# Patient Record
Sex: FEMALE | Race: WHITE | NOT HISPANIC OR LATINO | Employment: FULL TIME
[De-identification: names, ages, dates, MRNs, and addresses within clinical notes are randomized per-mention and may not be internally consistent; named-entity substitution may affect disease eponyms.]

---

## 2017-07-29 ENCOUNTER — HEALTH MAINTENANCE LETTER (OUTPATIENT)
Age: 37
End: 2017-07-29

## 2021-11-08 ENCOUNTER — TRANSFERRED RECORDS (OUTPATIENT)
Dept: HEALTH INFORMATION MANAGEMENT | Facility: CLINIC | Age: 41
End: 2021-11-08
Payer: COMMERCIAL

## 2021-11-16 ENCOUNTER — TRANSFERRED RECORDS (OUTPATIENT)
Dept: HEALTH INFORMATION MANAGEMENT | Facility: CLINIC | Age: 41
End: 2021-11-16
Payer: COMMERCIAL

## 2021-11-18 ENCOUNTER — TRANSCRIBE ORDERS (OUTPATIENT)
Dept: OTHER | Age: 41
End: 2021-11-18

## 2021-11-18 DIAGNOSIS — R79.89 ELEVATED PROLACTIN LEVEL: Primary | ICD-10-CM

## 2021-12-01 NOTE — TELEPHONE ENCOUNTER
RECORDS RECEIVED FROM: external   DATE RECEIVED: 12.13.21   NOTES (FOR ALL VISITS) STATUS DETAILS   OFFICE NOTES from referring provider In process 11.18.21 [facility]   OFFICE NOTES from other specialist N/A    ED NOTES N/A    OPERATIVE REPORT  (thyroid, pituitary, adrenal, parathyroid) N/A    MEDICATION LIST Internal    IMAGING      DEXASCAN N/A    MRI (BRAIN) N/A    XR (Chest) N/A    CT (HEAD/NECK/CHEST/ABDOMEN) N/A    NUCLEAR  N/A    ULTRASOUND (HEAD/NECK) N/A    LABS     DIABETES: HBGA1C, CREATININE, FASTING LIPIDS, MICROALBUMIN URINE, POTASSIUM, TSH, T4    THYROID: TSH, T4, CBC, THYRODLONULIN, TOTAL T3, FREE T4, CALCITONIN, CEA N/A           Action 12.1.21 MJ   Action Taken Called [facility] at [phone]  [facility] fax number: [phone]. Sent request.     -12.6.21 sv - records request sent to [facility]- [phone]     -12.9.21 sv-   records request sent to [facility]- [phone]  -12.10.21 sv- called and spoke with medical records, per staff then will send it ASAP today. Provided fax number   - received records from [facility]- sent to STAT uploading   -12.13.21 sv- records havent been uploaded. Sending records to uploading again

## 2021-12-13 ENCOUNTER — VIRTUAL VISIT (OUTPATIENT)
Dept: ENDOCRINOLOGY | Facility: CLINIC | Age: 41
End: 2021-12-13
Attending: NURSE PRACTITIONER
Payer: COMMERCIAL

## 2021-12-13 ENCOUNTER — PRE VISIT (OUTPATIENT)
Dept: ENDOCRINOLOGY | Facility: CLINIC | Age: 41
End: 2021-12-13

## 2021-12-13 DIAGNOSIS — E22.1 HYPERPROLACTINEMIA (H): Primary | ICD-10-CM

## 2021-12-13 PROCEDURE — 99204 OFFICE O/P NEW MOD 45 MIN: CPT | Mod: 95 | Performed by: INTERNAL MEDICINE

## 2021-12-13 RX ORDER — VENLAFAXINE HYDROCHLORIDE 150 MG/1
450 CAPSULE, EXTENDED RELEASE ORAL DAILY
COMMUNITY
Start: 2020-08-04

## 2021-12-13 RX ORDER — HYDROCHLOROTHIAZIDE 50 MG/1
1 TABLET ORAL DAILY
COMMUNITY
Start: 2021-09-17 | End: 2022-06-30 | Stop reason: ALTCHOICE

## 2021-12-13 RX ORDER — ALPRAZOLAM 2 MG
2 TABLET ORAL 2 TIMES DAILY PRN
COMMUNITY
Start: 2020-07-28

## 2021-12-13 RX ORDER — LISINOPRIL 20 MG/1
1 TABLET ORAL DAILY
COMMUNITY
Start: 2021-09-17

## 2021-12-13 RX ORDER — BUPROPION HYDROCHLORIDE 300 MG/1
300 TABLET ORAL EVERY MORNING
COMMUNITY

## 2021-12-13 RX ORDER — BUPROPION HYDROCHLORIDE 150 MG/1
150 TABLET ORAL EVERY MORNING
COMMUNITY

## 2021-12-13 NOTE — LETTER
"2021       RE: Sapphire Bautista  [address] [address]     Dear Colleague,    Thank you for referring your patient, Sapphire Bautista, to the [facility] [place] at [facility]. Please see a copy of my visit note below.      ENDOCRINOLOGY VIDEO VISIT NEW        HISTORY OF PRESENT ILLNESS    Sapphire Bautista is a 41 year old female who is being evaluated via a billable video visit. The patient is seen in consultation at the request of RENE Mccullough for hyperprolactinemia.    Received and reviewed records from [facility].    The patient was noted to have hyperprolactinemia in 2021 (results as detailed below) during evaluation for hormonal issues contributing to her symptoms.    The patient had been experiencing approximately 6 months (potentially slightly more) of hot flashes, increased diaphoresis, thinning hair, diminished libido and fluctuations in weight.  She estimates weight gain over the past year (she has history of restrictive eating disorder and does not regularly weigh herself but noticed changes in weight at medical appointments) but then experienced approximately 30 pound weight loss between summer 2021 and 2021.    She has not noted galactorrhea.  She is , with about 4 pregnancies ending in miscarriage.  She has no plans for conception in the future.  She had a uterine ablation for menorrhagia so menstrual cycles are lighter and last 1 to 2 days but are occurring every 32 to 36 days.    She has noted headaches which have been occurring daily and have been \"awful.\"  Headaches are typically frontal, sometimes occipital and have been present for some time, uncertain how long.  No associated visual changes or visual field cuts.    The patient has history of depression and anxiety.  She was prescribed Latuda which she took for about 3 weeks (was on the medication when she had prolactin level " checked).  She is now off the medication.    No herbals, no over-the-counter supplements outside of what is listed on medication list.  No high-dose biotin use.    Hormone therapy was initiated at gynecology practice: Was prescribed Prometrium which she took but discontinued due to issues with refills (also did not notice improvement in her symptoms).  Testosterone therapy was being contemplated by her gynecology provider but has been deferred until endocrine work-up has been completed.    No history of kidney disease.     Family history: Reviewed as below.  No history of pituitary tumors, pancreatic tumors, hyperparathyroidism/hypercalcemia, thyroid disease or thyroid cancer.    I received and reviewed outside records from referring clinic.  11/8/2021: Estradiol 102, DHEA-sulfate 204 (), FSH 1.6, prolactin 174.8, TSH 2.3, free T4 0.8, free T3 3.1, vitamin D 25 hydroxy 21, B12 258 (200-1100), testosterone 19 (2-45).    Pertinent Social History: , has 6 children.  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    PAST MEDICAL HISTORY  Past Medical History:   Diagnosis Date     Abnormal Pap smear 2005    leep. normal since     Diabetes mellitus (H)     GDDC     Other chronic pain      PONV (postoperative nausea and vomiting)        MEDICATIONS  Current Outpatient Medications   Medication Sig Dispense Refill     Acetaminophen (TYLENOL PO)        ALPRAZolam (XANAX) 2 MG tablet Take 2 mg by mouth 2 times daily as needed       buPROPion (WELLBUTRIN XL) 150 MG 24 hr tablet Take 150 mg by mouth every morning       buPROPion (WELLBUTRIN XL) 300 MG 24 hr tablet Take 300 mg by mouth every morning       hydrochlorothiazide (HYDRODIURIL) 50 MG tablet Take 1 tablet by mouth daily       ibuprofen (ADVIL,MOTRIN) 600 MG tablet Take 1 tablet (600 mg) by mouth every 6 hours as needed for pain (mild) 30 tablet  0     lisinopril (ZESTRIL) 20 MG tablet Take 1 tablet by mouth daily       venlafaxine (EFFEXOR-XR) 150 MG 24 hr capsule Take 450 mg by mouth daily       benzonatate (TESSALON) 100 MG capsule Take 2 capsules (200 mg) by mouth 3 times daily as needed for cough (Patient not taking: Reported on 12/13/2021) 24 capsule 0     FLUoxetine HCl (PROZAC PO) Take 20 mg by mouth daily (Patient not taking: Reported on 12/13/2021)       senna-docusate (SENOKOT-S;PERICOLACE) 8.6-50 MG per tablet Take 1-2 tablets by mouth 2 times daily Take while on oral narcotics to prevent or treat constipation. (Patient not taking: Reported on 12/13/2021) 30 tablet 0       Allergies, family, and social history were reviewed and documented as needed in EHR.     REVIEW OF SYSTEMS  A complete 10-point ROS was performed and pertinent positives and negatives are noted in the HPI.    PHYSICAL EXAM  There were no vitals taken for this visit.  There is no height or weight on file to calculate BMI.  Constitutional: Patient is alert, oriented and is sometimes tearful during our interview.    Eyes: Eyes grossly normal to inspection, EOMI, no stare, lid lag, or retraction; no conjunctival injection.  ENMT: Lips are without lesions.   Neck: No visible goiter or neck mass.  Respiratory: No audible wheeze or cough. No visible cyanosis. No visible increased work of breathing.  Neurological: Alert and oriented times 3.  Cranial nerves grossly intact.      DATA REVIEW  Each of the following laboratory and/or imaging studies were reviewed.  Outside labs reviewed, as summarized in HPI.    ASSESSMENT  1.  Hyperprolactinemia.  Was on Latuda, which could potentially cause hyperprolactinemia.  Now off the medication: Would recheck prolactin now.  Thyroid function tests last month were normal, recheck to confirm they remain in normal range.  Would also check CMP and hCG.  If hyperprolactinemia persists and no additional cause has been identified, would proceed with  pituitary MRI to screen for pituitary adenoma or central lesion with stalk effect causing hyperprolactinemia.  We discussed potential causes of hyperprolactinemia as well as proposed testing.    2.  Hot flashes and diaphoresis.  Potentially related to lowered gonadotropin levels and lower estradiol levels with hyperprolactinemia.  Would also screen for excess growth hormone.  Hormone therapy (including testosterone) was proposed by her healthcare provider in gynecology clinic: Would recommend deferring this given our ongoing hormonal evaluation and the potential for androgen excess and consequent symptoms.    3.  Anxiety and depression.  Following with psychiatrist and therapist.    PLAN  -Labs at earliest convenience  -If prolactin is elevated, would proceed with pituitary MRI; if prolactin is normal, would recheck in 1 to 2 weeks  -Hold off on hormonal therapies  -Follow-up for face-to-face visit in [place] at my earliest opening  -We will communicate results by phone (we also help set up [employer] access for future communication)    Orders Placed This Encounter   Procedures     Prolactin     Insulin Growth Factor 1 by Immunoassay     TSH     T4 free     Comprehensive metabolic panel     HCG Quantitative Pregnancy, Blood ([id number])     Video-Visit Details    Type of service:  Video Visit    Video Start Time: 11:55 AM  Video End Time: 12:27 PM    I spent a total of 48 minutes on the date of encounter reviewing medical records, evaluating the patient, coordinating care and documenting in the EHR, as detailed above.      Platform used for Video Visit: Atul Min MD   Division of Diabetes, Endocrinology and Metabolism  Department of Medicine      cc: Danita LÓPEZ; Мария Vazquez DO; Tony Núñez MD        Sapphire is a 41 year old who is being evaluated via a billable video visit.      How would you like to obtain your AVS? Mail a copy  If the video visit is dropped, the invitation should  be resent by: Text to cell phone: [phone]  Will anyone else be joining your video visit? No        Addendum 12/14/2021: Recheck prolactin normal.  TSH 1.83.  hCG negative.  IGF-1 pending. Clinic staff will contact patient with results: I would recommend a recheck of prolactin in early January 2022.  Traci Min MD

## 2021-12-13 NOTE — PROGRESS NOTES
"  ENDOCRINOLOGY VIDEO VISIT NEW        HISTORY OF PRESENT ILLNESS    Sapphire Bautista is a 41 year old female who is being evaluated via a billable video visit. The patient is seen in consultation at the request of RENE Mccullough for hyperprolactinemia.    Received and reviewed records from [facility].    The patient was noted to have hyperprolactinemia in 2021 (results as detailed below) during evaluation for hormonal issues contributing to her symptoms.    The patient had been experiencing approximately 6 months (potentially slightly more) of hot flashes, increased diaphoresis, thinning hair, diminished libido and fluctuations in weight.  She estimates weight gain over the past year (she has history of restrictive eating disorder and does not regularly weigh herself but noticed changes in weight at medical appointments) but then experienced approximately 30 pound weight loss between summer 2021 and 2021.    She has not noted galactorrhea.  She is , with about 4 pregnancies ending in miscarriage.  She has no plans for conception in the future.  She had a uterine ablation for menorrhagia so menstrual cycles are lighter and last 1 to 2 days but are occurring every 32 to 36 days.    She has noted headaches which have been occurring daily and have been \"awful.\"  Headaches are typically frontal, sometimes occipital and have been present for some time, uncertain how long.  No associated visual changes or visual field cuts.    The patient has history of depression and anxiety.  She was prescribed Latuda which she took for about 3 weeks (was on the medication when she had prolactin level checked).  She is now off the medication.    No herbals, no over-the-counter supplements outside of what is listed on medication list.  No high-dose biotin use.    Hormone therapy was initiated at gynecology practice: Was prescribed Prometrium which she took but discontinued due to issues with refills (also did " not notice improvement in her symptoms).  Testosterone therapy was being contemplated by her gynecology provider but has been deferred until endocrine work-up has been completed.    No history of kidney disease.     Family history: Reviewed as below.  No history of pituitary tumors, pancreatic tumors, hyperparathyroidism/hypercalcemia, thyroid disease or thyroid cancer.    I received and reviewed outside records from referring clinic.  11/8/2021: Estradiol 102, DHEA-sulfate 204 (), FSH 1.6, prolactin 174.8, TSH 2.3, free T4 0.8, free T3 3.1, vitamin D 25 hydroxy 21, B12 258 (200-1100), testosterone 19 (2-45).    Pertinent Social History: , has 6 children.  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    PAST MEDICAL HISTORY  Past Medical History:   Diagnosis Date     Abnormal Pap smear 2005    leep. normal since     Diabetes mellitus (H)     GDDC     Other chronic pain      PONV (postoperative nausea and vomiting)        MEDICATIONS  Current Outpatient Medications   Medication Sig Dispense Refill     Acetaminophen (TYLENOL PO)        ALPRAZolam (XANAX) 2 MG tablet Take 2 mg by mouth 2 times daily as needed       buPROPion (WELLBUTRIN XL) 150 MG 24 hr tablet Take 150 mg by mouth every morning       buPROPion (WELLBUTRIN XL) 300 MG 24 hr tablet Take 300 mg by mouth every morning       hydrochlorothiazide (HYDRODIURIL) 50 MG tablet Take 1 tablet by mouth daily       ibuprofen (ADVIL,MOTRIN) 600 MG tablet Take 1 tablet (600 mg) by mouth every 6 hours as needed for pain (mild) 30 tablet 0     lisinopril (ZESTRIL) 20 MG tablet Take 1 tablet by mouth daily       venlafaxine (EFFEXOR-XR) 150 MG 24 hr capsule Take 450 mg by mouth daily       benzonatate (TESSALON) 100 MG capsule Take 2 capsules (200 mg) by mouth 3 times daily as needed for cough (Patient not taking: Reported on 12/13/2021) 24  capsule 0     FLUoxetine HCl (PROZAC PO) Take 20 mg by mouth daily (Patient not taking: Reported on 12/13/2021)       senna-docusate (SENOKOT-S;PERICOLACE) 8.6-50 MG per tablet Take 1-2 tablets by mouth 2 times daily Take while on oral narcotics to prevent or treat constipation. (Patient not taking: Reported on 12/13/2021) 30 tablet 0       Allergies, family, and social history were reviewed and documented as needed in EHR.     REVIEW OF SYSTEMS  A complete 10-point ROS was performed and pertinent positives and negatives are noted in the HPI.    PHYSICAL EXAM  There were no vitals taken for this visit.  There is no height or weight on file to calculate BMI.  Constitutional: Patient is alert, oriented and is sometimes tearful during our interview.    Eyes: Eyes grossly normal to inspection, EOMI, no stare, lid lag, or retraction; no conjunctival injection.  ENMT: Lips are without lesions.   Neck: No visible goiter or neck mass.  Respiratory: No audible wheeze or cough. No visible cyanosis. No visible increased work of breathing.  Neurological: Alert and oriented times 3.  Cranial nerves grossly intact.      DATA REVIEW  Each of the following laboratory and/or imaging studies were reviewed.  Outside labs reviewed, as summarized in HPI.    ASSESSMENT  1.  Hyperprolactinemia.  Was on Latuda, which could potentially cause hyperprolactinemia.  Now off the medication: Would recheck prolactin now.  Thyroid function tests last month were normal, recheck to confirm they remain in normal range.  Would also check CMP and hCG.  If hyperprolactinemia persists and no additional cause has been identified, would proceed with pituitary MRI to screen for pituitary adenoma or central lesion with stalk effect causing hyperprolactinemia.  We discussed potential causes of hyperprolactinemia as well as proposed testing.    2.  Hot flashes and diaphoresis.  Potentially related to lowered gonadotropin levels and lower estradiol levels with  hyperprolactinemia.  Would also screen for excess growth hormone.  Hormone therapy (including testosterone) was proposed by her healthcare provider in gynecology clinic: Would recommend deferring this given our ongoing hormonal evaluation and the potential for androgen excess and consequent symptoms.    3.  Anxiety and depression.  Following with psychiatrist and therapist.    PLAN  -Labs at earliest convenience  -If prolactin is elevated, would proceed with pituitary MRI; if prolactin is normal, would recheck in 1 to 2 weeks  -Hold off on hormonal therapies  -Follow-up for face-to-face visit in [place] at my earliest opening  -We will communicate results by phone (we also help set up Virtual Web access for future communication)    Orders Placed This Encounter   Procedures     Prolactin     Insulin Growth Factor 1 by Immunoassay     TSH     T4 free     Comprehensive metabolic panel     HCG Quantitative Pregnancy, Blood ([id number])     Video-Visit Details    Type of service:  Video Visit    Video Start Time: 11:55 AM  Video End Time: 12:27 PM    I spent a total of 48 minutes on the date of encounter reviewing medical records, evaluating the patient, coordinating care and documenting in the EHR, as detailed above.      Platform used for Video Visit: Atul Min MD   Division of Diabetes, Endocrinology and Metabolism  Department of Medicine      cc: Danita LÓPEZ; Мария Vazquez DO; Tony Núñez MD        Sapphire is a 41 year old who is being evaluated via a billable video visit.      How would you like to obtain your AVS? Mail a copy  If the video visit is dropped, the invitation should be resent by: Text to cell phone: [phone]  Will anyone else be joining your video visit? No        Addendum 12/14/2021: Recheck prolactin normal.  TSH 1.83.  hCG negative.  IGF-1 pending. Clinic staff will contact patient with results: I would recommend a recheck of prolactin in early January 2022.  Traci  MD Pako

## 2021-12-13 NOTE — LETTER
"2021      RE: Sapphire Bautista  [address] [place] [id number]         ENDOCRINOLOGY VIDEO VISIT NEW        HISTORY OF PRESENT ILLNESS    Sapphire Bautista is a 41 year old female who is being evaluated via a billable video visit. The patient is seen in consultation at the request of RENE Mccullough for hyperprolactinemia.    Received and reviewed records from [facility].    The patient was noted to have hyperprolactinemia in 2021 (results as detailed below) during evaluation for hormonal issues contributing to her symptoms.    The patient had been experiencing approximately 6 months (potentially slightly more) of hot flashes, increased diaphoresis, thinning hair, diminished libido and fluctuations in weight.  She estimates weight gain over the past year (she has history of restrictive eating disorder and does not regularly weigh herself but noticed changes in weight at medical appointments) but then experienced approximately 30 pound weight loss between summer 2021 and 2021.    She has not noted galactorrhea.  She is , with about 4 pregnancies ending in miscarriage.  She has no plans for conception in the future.  She had a uterine ablation for menorrhagia so menstrual cycles are lighter and last 1 to 2 days but are occurring every 32 to 36 days.    She has noted headaches which have been occurring daily and have been \"awful.\"  Headaches are typically frontal, sometimes occipital and have been present for some time, uncertain how long.  No associated visual changes or visual field cuts.    The patient has history of depression and anxiety.  She was prescribed Latuda which she took for about 3 weeks (was on the medication when she had prolactin level checked).  She is now off the medication.    No herbals, no over-the-counter supplements outside of what is listed on medication list.  No high-dose biotin use.    Hormone therapy was initiated at gynecology practice: Was prescribed " Prometrium which she took but discontinued due to issues with refills (also did not notice improvement in her symptoms).  Testosterone therapy was being contemplated by her gynecology provider but has been deferred until endocrine work-up has been completed.    No history of kidney disease.     Family history: Reviewed as below.  No history of pituitary tumors, pancreatic tumors, hyperparathyroidism/hypercalcemia, thyroid disease or thyroid cancer.    I received and reviewed outside records from referring clinic.  11/8/2021: Estradiol 102, DHEA-sulfate 204 (), FSH 1.6, prolactin 174.8, TSH 2.3, free T4 0.8, free T3 3.1, vitamin D 25 hydroxy 21, B12 258 (200-1100), testosterone 19 (2-45).    Pertinent Social History: , has 6 children.  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    PAST MEDICAL HISTORY  Past Medical History:   Diagnosis Date     Abnormal Pap smear 2005    leep. normal since     Diabetes mellitus (H)     GDDC     Other chronic pain      PONV (postoperative nausea and vomiting)        MEDICATIONS  Current Outpatient Medications   Medication Sig Dispense Refill     Acetaminophen (TYLENOL PO)        ALPRAZolam (XANAX) 2 MG tablet Take 2 mg by mouth 2 times daily as needed       buPROPion (WELLBUTRIN XL) 150 MG 24 hr tablet Take 150 mg by mouth every morning       buPROPion (WELLBUTRIN XL) 300 MG 24 hr tablet Take 300 mg by mouth every morning       hydrochlorothiazide (HYDRODIURIL) 50 MG tablet Take 1 tablet by mouth daily       ibuprofen (ADVIL,MOTRIN) 600 MG tablet Take 1 tablet (600 mg) by mouth every 6 hours as needed for pain (mild) 30 tablet 0     lisinopril (ZESTRIL) 20 MG tablet Take 1 tablet by mouth daily       venlafaxine (EFFEXOR-XR) 150 MG 24 hr capsule Take 450 mg by mouth daily       benzonatate (TESSALON) 100 MG capsule Take 2 capsules (200 mg) by mouth 3  times daily as needed for cough (Patient not taking: Reported on 12/13/2021) 24 capsule 0     FLUoxetine HCl (PROZAC PO) Take 20 mg by mouth daily (Patient not taking: Reported on 12/13/2021)       senna-docusate (SENOKOT-S;PERICOLACE) 8.6-50 MG per tablet Take 1-2 tablets by mouth 2 times daily Take while on oral narcotics to prevent or treat constipation. (Patient not taking: Reported on 12/13/2021) 30 tablet 0       Allergies, family, and social history were reviewed and documented as needed in EHR.     REVIEW OF SYSTEMS  A complete 10-point ROS was performed and pertinent positives and negatives are noted in the HPI.    PHYSICAL EXAM  There were no vitals taken for this visit.  There is no height or weight on file to calculate BMI.  Constitutional: Patient is alert, oriented and is sometimes tearful during our interview.    Eyes: Eyes grossly normal to inspection, EOMI, no stare, lid lag, or retraction; no conjunctival injection.  ENMT: Lips are without lesions.   Neck: No visible goiter or neck mass.  Respiratory: No audible wheeze or cough. No visible cyanosis. No visible increased work of breathing.  Neurological: Alert and oriented times 3.  Cranial nerves grossly intact.      DATA REVIEW  Each of the following laboratory and/or imaging studies were reviewed.  Outside labs reviewed, as summarized in HPI.    ASSESSMENT  1.  Hyperprolactinemia.  Was on Latuda, which could potentially cause hyperprolactinemia.  Now off the medication: Would recheck prolactin now.  Thyroid function tests last month were normal, recheck to confirm they remain in normal range.  Would also check CMP and hCG.  If hyperprolactinemia persists and no additional cause has been identified, would proceed with pituitary MRI to screen for pituitary adenoma or central lesion with stalk effect causing hyperprolactinemia.  We discussed potential causes of hyperprolactinemia as well as proposed testing.    2.  Hot flashes and diaphoresis.   Potentially related to lowered gonadotropin levels and lower estradiol levels with hyperprolactinemia.  Would also screen for excess growth hormone.  Hormone therapy (including testosterone) was proposed by her healthcare provider in gynecology clinic: Would recommend deferring this given our ongoing hormonal evaluation and the potential for androgen excess and consequent symptoms.    3.  Anxiety and depression.  Following with psychiatrist and therapist.    PLAN  -Labs at earliest convenience  -If prolactin is elevated, would proceed with pituitary MRI; if prolactin is normal, would recheck in 1 to 2 weeks  -Hold off on hormonal therapies  -Follow-up for face-to-face visit in [place] at my earliest opening  -We will communicate results by phone (we also help set up [employer] access for future communication)    Orders Placed This Encounter   Procedures     Prolactin     Insulin Growth Factor 1 by Immunoassay     TSH     T4 free     Comprehensive metabolic panel     HCG Quantitative Pregnancy, Blood ([id number])     Video-Visit Details    Type of service:  Video Visit    Video Start Time: 11:55 AM  Video End Time: 12:27 PM    I spent a total of 48 minutes on the date of encounter reviewing medical records, evaluating the patient, coordinating care and documenting in the EHR, as detailed above.      Platform used for Video Visit: Atul Min MD   Division of Diabetes, Endocrinology and Metabolism  Department of Medicine      cc: Danita LÓPEZ; Мария Vazquez DO; Tony Núñez MD        Sapphire is a 41 year old who is being evaluated via a billable video visit.      How would you like to obtain your AVS? Mail a copy  If the video visit is dropped, the invitation should be resent by: Text to cell phone: [phone]  Will anyone else be joining your video visit? No        Addendum 12/14/2021: Recheck prolactin normal.  TSH 1.83.  hCG negative.  IGF-1 pending. Clinic staff will contact patient  with results: I would recommend a recheck of prolactin in early January 2022.  Traci Min MD

## 2021-12-13 NOTE — PATIENT INSTRUCTIONS
-Labs at earliest convenience  -If prolactin is elevated, would proceed with pituitary MRI; if prolactin is normal, would recheck in 1 to 2 weeks  -Hold off on hormonal therapies  -Follow-up for face-to-face visit in [place] at my earliest opening  -We will communicate results by phone (we also help set up [employer] access for future communication)

## 2021-12-14 ENCOUNTER — LAB (OUTPATIENT)
Dept: LAB | Facility: OTHER | Age: 41
End: 2021-12-14
Payer: COMMERCIAL

## 2021-12-14 ENCOUNTER — MYC MEDICAL ADVICE (OUTPATIENT)
Dept: ENDOCRINOLOGY | Facility: CLINIC | Age: 41
End: 2021-12-14

## 2021-12-14 ENCOUNTER — TELEPHONE (OUTPATIENT)
Dept: ENDOCRINOLOGY | Facility: CLINIC | Age: 41
End: 2021-12-14

## 2021-12-14 DIAGNOSIS — R53.83 FATIGUE, UNSPECIFIED TYPE: Primary | ICD-10-CM

## 2021-12-14 DIAGNOSIS — E22.1 HYPERPROLACTINEMIA (H): ICD-10-CM

## 2021-12-14 DIAGNOSIS — R23.2 HOT FLASHES: ICD-10-CM

## 2021-12-14 DIAGNOSIS — F41.9 ANXIETY: ICD-10-CM

## 2021-12-14 LAB
ALBUMIN SERPL-MCNC: 3.5 G/DL (ref 3.4–5)
ALP SERPL-CCNC: 82 U/L (ref 40–150)
ALT SERPL W P-5'-P-CCNC: 25 U/L (ref 0–50)
ANION GAP SERPL CALCULATED.3IONS-SCNC: 7 MMOL/L (ref 3–14)
AST SERPL W P-5'-P-CCNC: 13 U/L (ref 0–45)
B-HCG SERPL-ACNC: <1 IU/L (ref 0–5)
BILIRUB SERPL-MCNC: 0.3 MG/DL (ref 0.2–1.3)
BUN SERPL-MCNC: 18 MG/DL (ref 7–30)
CALCIUM SERPL-MCNC: 8.8 MG/DL (ref 8.5–10.1)
CHLORIDE BLD-SCNC: 100 MMOL/L (ref 94–109)
CO2 SERPL-SCNC: 29 MMOL/L (ref 20–32)
CREAT SERPL-MCNC: 0.92 MG/DL (ref 0.52–1.04)
GFR SERPL CREATININE-BSD FRML MDRD: 77 ML/MIN/1.73M2
GLUCOSE BLD-MCNC: 123 MG/DL (ref 70–99)
POTASSIUM BLD-SCNC: 3.5 MMOL/L (ref 3.4–5.3)
PROLACTIN SERPL-MCNC: 15 UG/L (ref 3–27)
PROT SERPL-MCNC: 7.4 G/DL (ref 6.8–8.8)
SODIUM SERPL-SCNC: 136 MMOL/L (ref 133–144)
T4 FREE SERPL-MCNC: 0.8 NG/DL (ref 0.76–1.46)
TSH SERPL DL<=0.005 MIU/L-ACNC: 1.83 MU/L (ref 0.4–4)

## 2021-12-14 PROCEDURE — 84443 ASSAY THYROID STIM HORMONE: CPT

## 2021-12-14 PROCEDURE — 80053 COMPREHEN METABOLIC PANEL: CPT

## 2021-12-14 PROCEDURE — 84702 CHORIONIC GONADOTROPIN TEST: CPT

## 2021-12-14 PROCEDURE — 84146 ASSAY OF PROLACTIN: CPT

## 2021-12-14 PROCEDURE — 36415 COLL VENOUS BLD VENIPUNCTURE: CPT

## 2021-12-14 PROCEDURE — 84305 ASSAY OF SOMATOMEDIN: CPT

## 2021-12-14 PROCEDURE — 84439 ASSAY OF FREE THYROXINE: CPT

## 2021-12-14 NOTE — TELEPHONE ENCOUNTER
[facility] Call Center    Phone Message    May a detailed message be left on voicemail: yes     Reason for Call: Other: Per patient has questions about recent lab results.      Action Taken: Other: ENDO    Travel Screening: Not Applicable

## 2021-12-14 NOTE — RESULT ENCOUNTER NOTE
Team - please call patient with the following message:  -Recheck prolactin is normal  -Thyroid function normal, with TSH 1.83  -hCG negative  -IGF-1 (marker for growth hormone) is pending  -I would recommend a recheck of prolactin in about 2 weeks. Orders are in [facility] system.   -Would you please confirm if patient has set up MyChart--if so, please let me know and I can send future results via [employer]  Traci Min MD

## 2021-12-15 ENCOUNTER — MYC MEDICAL ADVICE (OUTPATIENT)
Dept: ENDOCRINOLOGY | Facility: CLINIC | Age: 41
End: 2021-12-15
Payer: COMMERCIAL

## 2021-12-15 DIAGNOSIS — R53.83 FATIGUE, UNSPECIFIED TYPE: ICD-10-CM

## 2021-12-15 DIAGNOSIS — F41.9 ANXIETY: ICD-10-CM

## 2021-12-15 DIAGNOSIS — R23.2 HOT FLASHES: ICD-10-CM

## 2021-12-17 LAB — IGF-I BLD-MCNC: 188 NG/ML (ref 71–258)

## 2021-12-22 LAB
COLLECT DURATION TIME UR: 24 H
CREAT 24H UR-MRATE: 1.13 G/SPEC (ref 0.8–1.8)
CREAT UR-MCNC: 42 MG/DL
SPECIMEN VOL UR: 2700 ML

## 2021-12-22 PROCEDURE — 81050 URINALYSIS VOLUME MEASURE: CPT | Performed by: PATHOLOGY

## 2021-12-22 PROCEDURE — 82384 ASSAY THREE CATECHOLAMINES: CPT | Mod: 90 | Performed by: PATHOLOGY

## 2021-12-22 PROCEDURE — 82530 CORTISOL FREE: CPT | Mod: 90 | Performed by: PATHOLOGY

## 2021-12-22 PROCEDURE — 99000 SPECIMEN HANDLING OFFICE-LAB: CPT | Performed by: PATHOLOGY

## 2021-12-22 PROCEDURE — 82570 ASSAY OF URINE CREATININE: CPT | Performed by: PATHOLOGY

## 2021-12-22 PROCEDURE — 83497 ASSAY OF 5-HIAA: CPT | Mod: 90 | Performed by: PATHOLOGY

## 2021-12-22 PROCEDURE — 83835 ASSAY OF METANEPHRINES: CPT | Mod: 90 | Performed by: PATHOLOGY

## 2021-12-25 LAB
ANNOTATION COMMENT IMP: NORMAL
CORTIS F 24H UR HPLC-MCNC: 2.93 UG/L
CORTIS F 24H UR-MRATE: 7.9 UG/D
CORTIS F/CREAT 24H UR: 7.32 UG/G CRT
CREAT 24H UR-MRATE: 1080 MG/D
CREAT 24H UR-MRATE: 1134 MG/D
CREAT UR-MCNC: 40 MG/DL
CREAT UR-MCNC: 42 MG/DL
METANEPH 24H UR-MCNC: 27 UG/L
METANEPH 24H UR-MRATE: 73 UG/D
METANEPH+NORMETANEPH UR-IMP: ABNORMAL
METANEPH/CREAT 24H UR: 64 UG/G CRT
NORMETANEPHRINE 24H UR-MCNC: 203 UG/L
NORMETANEPHRINE 24H UR-MRATE: 548 UG/D
NORMETANEPHRINE/CREAT 24H UR: 483 UG/G CRT

## 2021-12-26 LAB
5HIAA & CREATININE UR-IMP: NORMAL
5OH-INDOLEACETATE 24H UR-MCNC: 1.1 MG/L
5OH-INDOLEACETATE 24H UR-MRATE: 3 MG/D
5OH-INDOLEACETATE/CREAT 24H UR: 3 MG/GCR
CATECHOLS UR-IMP: ABNORMAL
CREAT 24H UR-MRATE: 1080 MG/D
CREAT 24H UR-MRATE: 1080 MG/D
CREAT UR-MCNC: 40 MG/DL
CREAT UR-MCNC: 40 MG/DL
DOPAMINE 24H UR-MRATE: 181 UG/D
DOPAMINE UR-MCNC: 67 UG/L
DOPAMINE/CREAT UR: 168 UG/G CRT
EPINEPH 24H UR-MRATE: 3 UG/D
EPINEPH UR-MCNC: 1 UG/L
EPINEPH/CREAT UR: 2 UG/G CRT
NOREPINEPH 24H UR-MRATE: 73 UG/D
NOREPINEPH UR-MCNC: 27 UG/L
NOREPINEPH/CREAT UR: 68 UG/G CRT

## 2022-01-03 ENCOUNTER — LAB (OUTPATIENT)
Dept: LAB | Facility: CLINIC | Age: 42
End: 2022-01-03
Payer: COMMERCIAL

## 2022-01-03 DIAGNOSIS — E22.1 HYPERPROLACTINEMIA (H): ICD-10-CM

## 2022-01-03 DIAGNOSIS — R53.83 FATIGUE, UNSPECIFIED TYPE: ICD-10-CM

## 2022-01-03 LAB
CORTIS SERPL-MCNC: 32.8 UG/DL (ref 4–22)
PROLACTIN SERPL-MCNC: 38 UG/L (ref 3–27)

## 2022-01-03 PROCEDURE — 82024 ASSAY OF ACTH: CPT

## 2022-01-03 PROCEDURE — 84146 ASSAY OF PROLACTIN: CPT

## 2022-01-03 PROCEDURE — 82533 TOTAL CORTISOL: CPT

## 2022-01-03 PROCEDURE — 36415 COLL VENOUS BLD VENIPUNCTURE: CPT

## 2022-01-04 ENCOUNTER — ANCILLARY PROCEDURE (OUTPATIENT)
Dept: MRI IMAGING | Facility: CLINIC | Age: 42
End: 2022-01-04
Attending: INTERNAL MEDICINE
Payer: COMMERCIAL

## 2022-01-04 DIAGNOSIS — E22.1 HYPERPROLACTINEMIA (H): ICD-10-CM

## 2022-01-04 LAB — ACTH PLAS-MCNC: 41 PG/ML

## 2022-01-04 PROCEDURE — 70543 MRI ORBT/FAC/NCK W/O &W/DYE: CPT | Mod: TC | Performed by: RADIOLOGY

## 2022-01-04 PROCEDURE — A9585 GADOBUTROL INJECTION: HCPCS | Performed by: RADIOLOGY

## 2022-01-04 PROCEDURE — 99000 SPECIMEN HANDLING OFFICE-LAB: CPT

## 2022-01-04 PROCEDURE — 82533 TOTAL CORTISOL: CPT | Mod: 90

## 2022-01-04 RX ORDER — GADOBUTROL 604.72 MG/ML
10 INJECTION INTRAVENOUS ONCE
Status: COMPLETED | OUTPATIENT
Start: 2022-01-04 | End: 2022-01-04

## 2022-01-04 RX ADMIN — GADOBUTROL 10 ML: 604.72 INJECTION INTRAVENOUS at 09:40

## 2022-01-05 PROCEDURE — 82533 TOTAL CORTISOL: CPT | Mod: 90

## 2022-01-05 PROCEDURE — 99000 SPECIMEN HANDLING OFFICE-LAB: CPT

## 2022-01-06 ENCOUNTER — LAB (OUTPATIENT)
Dept: LAB | Facility: CLINIC | Age: 42
End: 2022-01-06
Payer: COMMERCIAL

## 2022-01-06 DIAGNOSIS — E22.1 HYPERPROLACTINEMIA (H): ICD-10-CM

## 2022-01-07 ENCOUNTER — DOCUMENTATION ONLY (OUTPATIENT)
Dept: ENDOCRINOLOGY | Facility: CLINIC | Age: 42
End: 2022-01-07
Payer: COMMERCIAL

## 2022-01-08 ENCOUNTER — HEALTH MAINTENANCE LETTER (OUTPATIENT)
Age: 42
End: 2022-01-08

## 2022-01-08 NOTE — PROGRESS NOTES
Prolactin drawn on 1/3/2022 returned mildly elevated once more.  Pituitary MRI shows probable microadenoma.  Contacted lab to discuss serial dilution in the event prolactin is mildly elevated due to hook effect.  Lab performed reevaluation on 1/6/2022: Manual dilution prolactin is 35.1 mcg/L, auto dilution prolactin is 34.3 mcg/L.  Of note, our assay can detect prolactin up to 50,000 ng/mL without hook effect.    Plan: Await results of late-night salivary cortisol measurement (although elevated total serum cortisol, 24 hr urine cortisol excretion has been normal).  If late-night salivary cortisol levels are normal, would consider a trial of dopamine agonist therapy although prolactin is only slightly elevated.    Results have been communicated to patient via [employer] as they have become available.    Traci Min MD

## 2022-01-09 LAB
CORTIS SAL-MCNC: 0.03 UG/DL
CORTIS SAL-MCNC: 0.06 UG/DL

## 2022-03-23 ENCOUNTER — TELEPHONE (OUTPATIENT)
Dept: CARDIOLOGY | Facility: CLINIC | Age: 42
End: 2022-03-23

## 2022-03-23 NOTE — TELEPHONE ENCOUNTER
"Return call to patient - confirmed her appt this am was new consult visit with Dr. Rojas - patient stated she cx'd appt today because \"she did not feel well\".      Instructed patient to follow-up with PCP or whomever referred her to cardiologist to address her sx until she can establish care with Dr. Rojas - informed her that PCP can refer her to any cardiologist to be seen sooner if needed - understanding verbalized.  mg  "

## 2022-03-23 NOTE — TELEPHONE ENCOUNTER
[facility] Call Center    Phone Message    May a detailed message be left on voicemail: yes     Reason for Call: Other: Sapphire called and rescheduled her appointment for today and would like a call back from Dr. Rojas's team. Sapphire stated that she is getting light headed, dizzy and naseous and she is wondering what could be causing that and if her appointment is necessary or if she should be seen sooner. Sapphire also stated that she had a 14 day monitor done already. Please advise. Thank you.      Action Taken: Message routed to:  Other: NELLY    Travel Screening: Not Applicable

## 2022-05-26 ENCOUNTER — TRANSFERRED RECORDS (OUTPATIENT)
Dept: HEALTH INFORMATION MANAGEMENT | Facility: CLINIC | Age: 42
End: 2022-05-26
Payer: COMMERCIAL

## 2022-05-26 ENCOUNTER — MEDICAL CORRESPONDENCE (OUTPATIENT)
Dept: HEALTH INFORMATION MANAGEMENT | Facility: CLINIC | Age: 42
End: 2022-05-26
Payer: COMMERCIAL

## 2022-05-26 DIAGNOSIS — E55.9 AVITAMINOSIS D: ICD-10-CM

## 2022-05-26 DIAGNOSIS — E53.8 BIOTIN-(PROPIONYL-COA-CARBOXYLASE) LIGASE DEFICIENCY: Primary | ICD-10-CM

## 2022-06-01 ENCOUNTER — LAB (OUTPATIENT)
Dept: LAB | Facility: CLINIC | Age: 42
End: 2022-06-01
Attending: PHYSICIAN ASSISTANT
Payer: COMMERCIAL

## 2022-06-01 DIAGNOSIS — E55.9 AVITAMINOSIS D: ICD-10-CM

## 2022-06-01 DIAGNOSIS — E53.8 BIOTIN-(PROPIONYL-COA-CARBOXYLASE) LIGASE DEFICIENCY: ICD-10-CM

## 2022-06-01 LAB
IRON SATN MFR SERPL: 23 % (ref 15–46)
IRON SERPL-MCNC: 81 UG/DL (ref 35–180)
TIBC SERPL-MCNC: 350 UG/DL (ref 240–430)
TOTAL PROTEIN SERUM FOR ELP: 7.1 G/DL (ref 6.8–8.8)
VIT B12 SERPL-MCNC: 233 PG/ML (ref 193–986)

## 2022-06-01 PROCEDURE — 86592 SYPHILIS TEST NON-TREP QUAL: CPT

## 2022-06-01 PROCEDURE — 86334 IMMUNOFIX E-PHORESIS SERUM: CPT

## 2022-06-01 PROCEDURE — 82607 VITAMIN B-12: CPT

## 2022-06-01 PROCEDURE — 36415 COLL VENOUS BLD VENIPUNCTURE: CPT

## 2022-06-01 PROCEDURE — 83550 IRON BINDING TEST: CPT

## 2022-06-01 PROCEDURE — 82306 VITAMIN D 25 HYDROXY: CPT

## 2022-06-01 PROCEDURE — 84155 ASSAY OF PROTEIN SERUM: CPT

## 2022-06-01 PROCEDURE — 84165 PROTEIN E-PHORESIS SERUM: CPT

## 2022-06-02 LAB
ALBUMIN SERPL ELPH-MCNC: 4.3 G/DL (ref 3.7–5.1)
ALPHA1 GLOB SERPL ELPH-MCNC: 0.3 G/DL (ref 0.2–0.4)
ALPHA2 GLOB SERPL ELPH-MCNC: 0.7 G/DL (ref 0.5–0.9)
B-GLOBULIN SERPL ELPH-MCNC: 0.9 G/DL (ref 0.6–1)
DEPRECATED CALCIDIOL+CALCIFEROL SERPL-MC: 35 UG/L (ref 20–75)
GAMMA GLOB SERPL ELPH-MCNC: 0.9 G/DL (ref 0.7–1.6)
M PROTEIN SERPL ELPH-MCNC: 0 G/DL
PROT PATTERN SERPL ELPH-IMP: NORMAL
PROT PATTERN SERPL IFE-IMP: NORMAL

## 2022-06-03 LAB — RPR SER QL: NONREACTIVE

## 2022-06-06 ENCOUNTER — TRANSCRIBE ORDERS (OUTPATIENT)
Dept: OTHER | Age: 42
End: 2022-06-06

## 2022-06-06 DIAGNOSIS — I95.1 DYSAUTONOMIA ORTHOSTATIC HYPOTENSION SYNDROME: Primary | ICD-10-CM

## 2022-06-18 PROBLEM — F41.9 ANXIETY AND DEPRESSION: Status: ACTIVE | Noted: 2019-07-10

## 2022-06-18 PROBLEM — G47.9 DYSSOMNIA: Status: ACTIVE | Noted: 2022-05-25

## 2022-06-18 PROBLEM — M43.10 SPONDYLOLISTHESIS: Status: ACTIVE | Noted: 2019-07-10

## 2022-06-18 PROBLEM — M48.061 LUMBAR STENOSIS: Status: ACTIVE | Noted: 2019-07-10

## 2022-06-18 PROBLEM — F32.A ANXIETY AND DEPRESSION: Status: ACTIVE | Noted: 2019-07-10

## 2022-06-18 RX ORDER — PROPRANOLOL HCL 60 MG
CAPSULE, EXTENDED RELEASE 24HR ORAL
COMMUNITY
Start: 2022-04-15

## 2022-06-18 RX ORDER — CYANOCOBALAMIN 1000 UG/ML
INJECTION, SOLUTION INTRAMUSCULAR; SUBCUTANEOUS
COMMUNITY
Start: 2022-06-02

## 2022-06-18 RX ORDER — METHOCARBAMOL 500 MG/1
TABLET, FILM COATED ORAL
COMMUNITY
Start: 2022-06-14

## 2022-06-18 RX ORDER — TERAZOSIN 2 MG/1
CAPSULE ORAL
COMMUNITY
Start: 2022-04-15

## 2022-06-18 RX ORDER — BUTALBITAL, ACETAMINOPHEN AND CAFFEINE 50; 325; 40 MG/1; MG/1; MG/1
TABLET ORAL
COMMUNITY
Start: 2022-06-08

## 2022-06-18 RX ORDER — TEMAZEPAM 30 MG
CAPSULE ORAL
COMMUNITY
Start: 2022-04-18

## 2022-06-18 RX ORDER — TERAZOSIN 1 MG/1
CAPSULE ORAL
COMMUNITY
Start: 2022-04-15

## 2022-06-18 RX ORDER — QUETIAPINE FUMARATE 50 MG/1
TABLET, FILM COATED ORAL
COMMUNITY
Start: 2022-06-07

## 2022-06-20 ASSESSMENT — ENCOUNTER SYMPTOMS
DIFFICULTY URINATING: 0
TREMORS: 1
DEPRESSION: 0
SLEEP DISTURBANCES DUE TO BREATHING: 0
ARTHRALGIAS: 1
BACK PAIN: 1
HOARSE VOICE: 0
COUGH DISTURBING SLEEP: 0
HEADACHES: 1
VOMITING: 0
INCREASED ENERGY: 1
ALTERED TEMPERATURE REGULATION: 1
SYNCOPE: 0
BLOATING: 1
DYSPNEA ON EXERTION: 1
ORTHOPNEA: 0
DYSURIA: 0
SWOLLEN GLANDS: 0
CHILLS: 0
HYPERTENSION: 1
LEG PAIN: 1
HALLUCINATIONS: 0
RECTAL PAIN: 0
HEMOPTYSIS: 0
POSTURAL DYSPNEA: 0
JOINT SWELLING: 1
WEIGHT GAIN: 0
WEIGHT LOSS: 0
POLYDIPSIA: 0
SMELL DISTURBANCE: 0
HOT FLASHES: 1
TROUBLE SWALLOWING: 1
DECREASED APPETITE: 0
WEAKNESS: 1
JAUNDICE: 0
SPUTUM PRODUCTION: 0
BOWEL INCONTINENCE: 0
NECK PAIN: 1
WHEEZING: 0
INSOMNIA: 0
MUSCLE WEAKNESS: 1
POLYPHAGIA: 0
LIGHT-HEADEDNESS: 1
SINUS CONGESTION: 0
NUMBNESS: 1
ABDOMINAL PAIN: 1
HEARTBURN: 1
EXERCISE INTOLERANCE: 1
POOR WOUND HEALING: 0
TASTE DISTURBANCE: 0
DECREASED LIBIDO: 1
MEMORY LOSS: 1
EYE PAIN: 0
DISTURBANCES IN COORDINATION: 0
NAIL CHANGES: 0
SEIZURES: 0
HYPOTENSION: 1
DOUBLE VISION: 0
EYE IRRITATION: 0
FATIGUE: 1
DIARRHEA: 1
SNORES LOUDLY: 1
SHORTNESS OF BREATH: 1
CONSTIPATION: 1
BRUISES/BLEEDS EASILY: 1
BLOOD IN STOOL: 0
SPEECH CHANGE: 0
STIFFNESS: 1
EYE REDNESS: 0
PALPITATIONS: 1
MUSCLE CRAMPS: 1
NECK MASS: 0
TINGLING: 1
EYE WATERING: 1
MYALGIAS: 1
SINUS PAIN: 0
PANIC: 1
PARALYSIS: 0
LOSS OF CONSCIOUSNESS: 0
FLANK PAIN: 0
NERVOUS/ANXIOUS: 1
DECREASED CONCENTRATION: 1
SKIN CHANGES: 0
FEVER: 1
SORE THROAT: 0
DIZZINESS: 1
HEMATURIA: 0
COUGH: 0
NIGHT SWEATS: 1
NAUSEA: 1

## 2022-06-28 NOTE — TELEPHONE ENCOUNTER
Action 6-28-22   Action Taken Requested from [facility]:    Echo    CTA Abd/pelvis   6-1-22 2-28-22     Holter 3-16-22 viewable in Epic. EKG 2-18-22 viewable in Epic    Resolved images in PACS 6-29

## 2022-06-30 ENCOUNTER — OFFICE VISIT (OUTPATIENT)
Dept: CARDIOLOGY | Facility: CLINIC | Age: 42
End: 2022-06-30
Attending: OBSTETRICS & GYNECOLOGY
Payer: COMMERCIAL

## 2022-06-30 ENCOUNTER — PRE VISIT (OUTPATIENT)
Dept: CARDIOLOGY | Facility: CLINIC | Age: 42
End: 2022-06-30

## 2022-06-30 VITALS — HEIGHT: 65 IN | BODY MASS INDEX: 37.37 KG/M2

## 2022-06-30 DIAGNOSIS — R00.0 SINUS TACHYCARDIA: Primary | ICD-10-CM

## 2022-06-30 PROCEDURE — G0463 HOSPITAL OUTPT CLINIC VISIT: HCPCS | Mod: 25

## 2022-06-30 PROCEDURE — 93005 ELECTROCARDIOGRAM TRACING: CPT

## 2022-06-30 PROCEDURE — 99205 OFFICE O/P NEW HI 60 MIN: CPT | Performed by: INTERNAL MEDICINE

## 2022-06-30 RX ORDER — ONDANSETRON 8 MG/1
TABLET, ORALLY DISINTEGRATING ORAL
COMMUNITY
Start: 2022-06-28

## 2022-06-30 RX ORDER — KETOROLAC TROMETHAMINE 10 MG/1
TABLET, FILM COATED ORAL
COMMUNITY
Start: 2022-06-28

## 2022-06-30 RX ORDER — METOPROLOL TARTRATE 25 MG/1
25 TABLET, FILM COATED ORAL 2 TIMES DAILY
Qty: 60 TABLET | Refills: 3 | Status: SHIPPED | OUTPATIENT
Start: 2022-06-30

## 2022-06-30 ASSESSMENT — PAIN SCALES - GENERAL: PAINLEVEL: NO PAIN (0)

## 2022-06-30 NOTE — LETTER
6/30/2022      RE: Sapphire Bautista  [address] [facility] [address]       Dear Colleague,    Thank you for the opportunity to participate in the care of your patient, Sapphire Bautista, at the [facility] [place] [facility]. Please see a copy of my visit note below.    I am delighted to see Sapphire Bautista as a new patient in cardiology clinic for evaluation of racing heart beats, dizziness, sweating.     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 She has not fainted but has been close. She describes a recent experience at the dentist's office when she was lying down, then sat up, then down again and she was nauseated, hot, sweaty. She has previously seen a cardiologist recently, ambulatory monitor/echo all normal. She was told she has dysautonomia and to wear spandex. She does wear compression stockings on occasion.    She lives with her  and family, has 6 kids total but only 2 remain at home. She works in an 's office usually from 10am-4pm. She drinks about 1-1.5L water a day, some green tea, no coffee/soda/energy drinks. Does not smoke or drink alcohol. She is chronically fatigued, feels bad when she tries to walk from her house to the mailbox, so not doing any regular exercise.     Past Medical History:  Hypertension  Knee arthroscopy  L4-S1 fusion  PTSD    Medications:   Lisinopril 20 every day  hydrochlorothiazide 50 every day    Effexor  Seroquel  Methocarbamol  Wellbutrin  Xanax    Allergies:    Allergies   Allergen Reactions     Peanuts [Nuts] Anaphylaxis     Seafood Anaphylaxis     Shrimp Extract Allergy Skin Test Anaphylaxis     Shellfish Allergy Hives       Physical examination  Orthostatics:   supine 129/87,   Sitting 127/88,   Standing 3 min 137/90, HR  118  She as lightheaded, dizzy the entire time, reported confusion and mottle legs with standing    Weight 100.5 kg    Constitutional: In general, the patient is in distress, frustrated that she doesn't feel well; mildly diaphoretic  Cardiovascular: Carotids +2/2 bilaterally without bruits.  No jugular venous distension. Regular rate and rhythm. Normal S1, S2. No murmur, rub, click, or gallop.   Extremities: Pulses are normal bilaterally throughout. No peripheral edema.  Respiratory: Clear to asculation.  No ronchi, wheezes, rales.  No dullness to percussion.     I have personally and independently reviewed the following:  Labs:   12/14/2021: K 3.5, cr 0.92, TSH 1.83    Echo:  6/1/2022: EF 60-65%, normal valves    EKG 6/30/2022:  Sinus tachycardia 105 bpm    Patch monitor 2/18-3/4/2022:  Sinus average 90 bpm, range  bpm  PACs/PVCs <1%    Assessment :  Sinus tachycardia, with symptoms and features suggestive of autonomic dysfunction.   Explained that treatment is supportive. Recommend:  Sleep study - she says it's scheduled  Aggressive hydration 2-3L  Stop hydrochlorothiazide  Start metoprolol tartrate 25 bid  Regular walking 5-10 minutes    She started crying, very frustrated that this is no immediate treatment, wanted to know why her legs get so mottled which I cannot explain as her skin was warm and dry and distal pulses normal bilaterally. I again offered the above advice and sent prescription to her pharmacy, she said she'll try it.    I spent a total of 40 minutes face to face with  Sapphire Bautista during today's office visit. I have spend an additional 20 minutes today on chart review and documentation.        The patient is to return as above . The patient understood the treatment plan as outlined above.  There were no barriers to learning.      Yolanda Rojo MD

## 2022-06-30 NOTE — PATIENT INSTRUCTIONS
"You were seen today in the Cardiovascular Clinic at the [facility].     Cardiology Providers you saw during your visit: Dr. Yolanda Rojo    Diagnosis:  sinus tachycardia    Results: discussed with patient    Orders:   None    Medication Changes:   Stop hydrochlorothiazide  Begin metoprolol tartrate mg twice a day    Recommendations:   Aggressive hydration 2-3 L  Compression stockings  Walk daily    Follow-up:   2 months     Please follow up with primary care provider for medication refills         Please feel free to call me with any questions or concerns.       Marissa Veliz RN     Questions and [phone].   First press #1 for the [employer] and then press #3 for \"Medical Questions\" to reach us Cardiology Nurses.      On Call Cardiologist for after hours or on weekends: [phone]   option #4 and ask to speak to the on-call Cardiologist.          If you need a medication refill please contact your pharmacy.  Please allow 3 business days for your refill to be completed.   "

## 2022-06-30 NOTE — PROGRESS NOTES
I am delighted to see Sapphier Bautista as a new patient in cardiology clinic for evaluation of racing heart beats, dizziness, sweating.     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 She has not fainted but has been close. She describes a recent experience at the dentist's office when she was lying down, then sat up, then down again and she was nauseated, hot, sweaty. She has previously seen a cardiologist recently, ambulatory monitor/echo all normal. She was told she has dysautonomia and to wear spandex. She does wear compression stockings on occasion.    She lives with her  and family, has 6 kids total but only 2 remain at home. She works in an aesthetician's office usually from 10am-4pm. She drinks about 1-1.5L water a day, some green tea, no coffee/soda/energy drinks. Does not smoke or drink alcohol. She is chronically fatigued, feels bad when she tries to walk from her house to the mailbox, so not doing any regular exercise.     Past Medical History:  Hypertension  Knee arthroscopy  L4-S1 fusion  PTSD    Medications:   Lisinopril 20 every day  hydrochlorothiazide 50 every day    Effexor  Seroquel  Methocarbamol  Wellbutrin  Xanax    Allergies:    Allergies   Allergen Reactions     Peanuts [Nuts] Anaphylaxis     Seafood Anaphylaxis     Shrimp Extract Allergy Skin Test Anaphylaxis     Shellfish Allergy Hives       Physical examination  Orthostatics:   supine 129/87,   Sitting 127/88,   Standing 3 min 137/90,   She as lightheaded, dizzy the entire time, reported confusion and mottle legs with standing    Weight 100.5 kg    Constitutional: In general, the patient is in distress, frustrated that she doesn't feel well; mildly diaphoretic  Cardiovascular: Carotids +2/2 bilaterally without bruits.  No jugular venous distension. Regular rate and  rhythm. Normal S1, S2. No murmur, rub, click, or gallop.   Extremities: Pulses are normal bilaterally throughout. No peripheral edema.  Respiratory: Clear to asculation.  No ronchi, wheezes, rales.  No dullness to percussion.     I have personally and independently reviewed the following:  Labs:   12/14/2021: K 3.5, cr 0.92, TSH 1.83    Echo:  6/1/2022: EF 60-65%, normal valves    EKG 6/30/2022:  Sinus tachycardia 105 bpm    Patch monitor 2/18-3/4/2022:  Sinus average 90 bpm, range  bpm  PACs/PVCs <1%    Assessment :  Sinus tachycardia, with symptoms and features suggestive of autonomic dysfunction.   Explained that treatment is supportive. Recommend:  Sleep study - she says it's scheduled  Aggressive hydration 2-3L  Stop hydrochlorothiazide  Start metoprolol tartrate 25 bid  Regular walking 5-10 minutes    She started crying, very frustrated that this is no immediate treatment, wanted to know why her legs get so mottled which I cannot explain as her skin was warm and dry and distal pulses normal bilaterally. I again offered the above advice and sent prescription to her pharmacy, she said she'll try it.    I spent a total of 40 minutes face to face with  Sapphire Bautista during today's office visit. I have spend an additional 20 minutes today on chart review and documentation.        The patient is to return as above . The patient understood the treatment plan as outlined above.  There were no barriers to learning.      Yolanda Rojo MD

## 2022-06-30 NOTE — NURSING NOTE
Pt to start Metoprolol 25 mg BID. Pt also instructed on aggressive hydration( 2-3 L daily), wearing compression stockings, and walking daily.     Follow up with Dr. Rojo in 2 months.     Bartolome Bland RN   Cardiology Nurse Coordinator

## 2022-06-30 NOTE — NURSING NOTE
Chief Complaint   Patient presents with     New Patient     NEW POTS referral    Vitals were taken, medications reconciled, and EKG was performed. Orthostatics performed.    Ben Correa, EMT  2:47 PM

## 2022-07-01 LAB
ATRIAL RATE - MUSE: 105 BPM
DIASTOLIC BLOOD PRESSURE - MUSE: NORMAL MMHG
INTERPRETATION ECG - MUSE: NORMAL
P AXIS - MUSE: 39 DEGREES
PR INTERVAL - MUSE: 152 MS
QRS DURATION - MUSE: 82 MS
QT - MUSE: 326 MS
QTC - MUSE: 430 MS
R AXIS - MUSE: 20 DEGREES
SYSTOLIC BLOOD PRESSURE - MUSE: NORMAL MMHG
T AXIS - MUSE: 80 DEGREES
VENTRICULAR RATE- MUSE: 105 BPM

## 2022-08-19 ENCOUNTER — TELEPHONE (OUTPATIENT)
Dept: CARDIOLOGY | Facility: CLINIC | Age: 42
End: 2022-08-19

## 2022-10-18 ENCOUNTER — TELEPHONE (OUTPATIENT)
Dept: CARDIOLOGY | Facility: CLINIC | Age: 42
End: 2022-10-18

## 2022-10-18 NOTE — TELEPHONE ENCOUNTER
NITISH letting pt that her appt with Gloria for tomorrow was cancelled also to give me a call back also Sent addi

## 2022-11-20 ENCOUNTER — HEALTH MAINTENANCE LETTER (OUTPATIENT)
Age: 42
End: 2022-11-20

## 2023-04-15 ENCOUNTER — HEALTH MAINTENANCE LETTER (OUTPATIENT)
Age: 43
End: 2023-04-15

## 2023-09-16 ENCOUNTER — HEALTH MAINTENANCE LETTER (OUTPATIENT)
Age: 43
End: 2023-09-16

## 2024-06-22 ENCOUNTER — HEALTH MAINTENANCE LETTER (OUTPATIENT)
Age: 44
End: 2024-06-22

## 2025-07-12 ENCOUNTER — HEALTH MAINTENANCE LETTER (OUTPATIENT)
Age: 45
End: 2025-07-12